# Patient Record
(demographics unavailable — no encounter records)

---

## 2024-10-07 NOTE — HISTORY OF PRESENT ILLNESS
[de-identified] : Patient with h/o cerumen impactions presents today stating that his ears feel clogged. He states that his ears would not equalize after being on a plane recently. He states that he uses Q-tips. He states that he snores at night. He states that he feels tired in the mornings intermittently and takes naps during the day.

## 2024-10-07 NOTE — PHYSICAL EXAM
[] : septum deviated to the right [Midline] : trachea located in midline position [Normal] : no rashes [Hearing Loss Right Only] : normal [Hearing Loss Left Only] : normal [FreeTextEntry8] :  cerumen removed via curettage and suction. eczema [FreeTextEntry9] :  cerumen removed via curettage and suction [de-identified] : inflamed turbinates [de-identified] : tongue tie [de-identified] : class 2 [de-identified] : type 3 oral cavity. edema of the uvula. 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  sensations intact

## 2024-10-07 NOTE — ADDENDUM
Encounter Date: 2/18/2022       History     Chief Complaint   Patient presents with    Deep Vein Thrombosis     Pt present to ED with pain to L calf, pt had a knee replacement to L knee on yesterday      Patient had knee replacement yesterday, and today is having left lower leg pain and swelling.  Denies any fever or sob.      The history is provided by the patient.   Leg Pain   There was no injury mechanism. The incident occurred 3 to 5 hours ago.     Review of patient's allergies indicates:  No Known Allergies  History reviewed. No pertinent past medical history.  History reviewed. No pertinent surgical history.  History reviewed. No pertinent family history.  Social History     Tobacco Use    Smoking status: Never Smoker    Smokeless tobacco: Never Used   Substance Use Topics    Alcohol use: Not Currently    Drug use: Never     Review of Systems   Constitutional: Negative for fever.   HENT: Negative for sore throat.    Respiratory: Negative for shortness of breath.    Cardiovascular: Negative for chest pain.   Gastrointestinal: Negative for nausea.   Genitourinary: Negative for dysuria.   Musculoskeletal: Negative for back pain.   Skin: Negative for rash.   Neurological: Negative for weakness.   Hematological: Does not bruise/bleed easily.       Physical Exam     Initial Vitals [02/18/22 1358]   BP Pulse Resp Temp SpO2   (!) 121/56 77 (!) 22 98.1 °F (36.7 °C) 97 %      MAP       --         Physical Exam    Nursing note and vitals reviewed.  Constitutional: He appears well-developed and well-nourished. No distress.   HENT:   Head: Normocephalic and atraumatic.   Mouth/Throat: Oropharynx is clear and moist.   Eyes: Conjunctivae and EOM are normal. Pupils are equal, round, and reactive to light.   Neck: Neck supple.   Normal range of motion.  Cardiovascular: Normal rate, regular rhythm and normal heart sounds. Exam reveals no gallop and no friction rub.    No murmur heard.  Pulmonary/Chest: Breath sounds  [FreeTextEntry1] :  Documented by Jose Roberto Márquez acting as scribe for Dr. Flores on 10/07/2024. All Medical record entries made by the Scribe were at my, Dr. Flores, direction and personally dictated by me on 10/07/2024 . I have reviewed the chart and agree that the record accurately reflects my personal performance of the history, physical exam, assessment and plan. I have also personally directed, reviewed, and agreed with the discharge instructions. normal. No respiratory distress. He has no wheezes. He has no rhonchi. He has no rales.   Abdominal: Abdomen is soft. Bowel sounds are normal. He exhibits no distension and no mass. There is no abdominal tenderness. There is no rebound and no guarding.   Musculoskeletal:         General: No edema. Normal range of motion.      Cervical back: Normal range of motion and neck supple.     Neurological: He is alert and oriented to person, place, and time. He has normal strength.   Skin: Skin is warm and dry. No rash noted.        Psychiatric: He has a normal mood and affect. Thought content normal.         ED Course   Procedures  Labs Reviewed   HEPATITIS C ANTIBODY   HEP C VIRUS HOLD SPECIMEN          Imaging Results          US Lower Extremity Veins Left (Final result)  Result time 02/18/22 14:55:08    Final result by Charlene López MD (02/18/22 14:55:08)                 Impression:      No evidence of deep venous thrombosis in the left lower extremity.      Electronically signed by: Charlene López  Date:    02/18/2022  Time:    14:55             Narrative:    EXAMINATION:  US LOWER EXTREMITY VEINS LEFT    CLINICAL HISTORY:  Pain in leg, unspecified    TECHNIQUE:  Duplex and color flow Doppler evaluation and graded compression of the left lower extremity veins was performed.    COMPARISON:  None    FINDINGS:  Left thigh veins: The common femoral, femoral, popliteal, upper greater saphenous, and deep femoral veins are patent and free of thrombus. The veins are normally compressible and have normal phasic flow and augmentation response.    Left calf veins: The visualized calf veins are patent.    Contralateral CFV: The contralateral (right) common femoral vein is patent and free of thrombus.    Miscellaneous: None                                 Medications - No data to display                       Clinical Impression:   Final diagnoses:  [M79.606] Leg pain          ED Disposition Condition    Discharge Stable         ED Prescriptions     None        Follow-up Information     Follow up With Specialties Details Why Contact Info    PCP               Yusuf Vargas MD  02/18/22 [id number]

## 2024-10-07 NOTE — CONSULT LETTER
[Dear  ___] : Dear  [unfilled], [Courtesy Letter:] : I had the pleasure of seeing your patient, [unfilled], in my office today. [Please see my note below.] : Please see my note below. [Consult Closing:] : Thank you very much for allowing me to participate in the care of this patient.  If you have any questions, please do not hesitate to contact me. [Sincerely,] : Sincerely, [FreeTextEntry3] :  Roger Flores MD FACS

## 2024-10-07 NOTE — ASSESSMENT
[FreeTextEntry1] :  Reviewed and reconciled medications, allergies, PMHx, PSHx, SocHx, FMHx.  Patient with h/o cerumen impactions presents today stating that his ears feel clogged. He states that his ears would not equalize after being on a plane recently. He states that he uses Q-tips. He states that he snores at night. He states that he feels tired in the mornings intermittently and takes naps during the day.   Physical exam: -right ear canal: cerumen removed via curettage and suction. 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 Let the warm water run in the ears while showering and dry with a towel -Use ear cleaning system -Patient declines sleep study -FU in 1 year